# Patient Record
Sex: FEMALE | Race: BLACK OR AFRICAN AMERICAN | NOT HISPANIC OR LATINO | URBAN - METROPOLITAN AREA
[De-identification: names, ages, dates, MRNs, and addresses within clinical notes are randomized per-mention and may not be internally consistent; named-entity substitution may affect disease eponyms.]

---

## 2018-07-06 ENCOUNTER — EMERGENCY (EMERGENCY)
Facility: HOSPITAL | Age: 33
LOS: 1 days | Discharge: HOME | End: 2018-07-06
Attending: EMERGENCY MEDICINE | Admitting: EMERGENCY MEDICINE
Payer: SELF-PAY

## 2018-07-06 VITALS
DIASTOLIC BLOOD PRESSURE: 79 MMHG | TEMPERATURE: 98 F | OXYGEN SATURATION: 100 % | SYSTOLIC BLOOD PRESSURE: 128 MMHG | RESPIRATION RATE: 18 BRPM | HEART RATE: 87 BPM

## 2018-07-06 DIAGNOSIS — Z98.51 TUBAL LIGATION STATUS: Chronic | ICD-10-CM

## 2018-07-06 LAB
ALBUMIN SERPL ELPH-MCNC: 4.6 G/DL — SIGNIFICANT CHANGE UP (ref 3.3–5)
ALP SERPL-CCNC: 44 U/L — SIGNIFICANT CHANGE UP (ref 40–120)
ALT FLD-CCNC: 12 U/L — SIGNIFICANT CHANGE UP (ref 4–33)
ANISOCYTOSIS BLD QL: SIGNIFICANT CHANGE UP
AST SERPL-CCNC: 17 U/L — SIGNIFICANT CHANGE UP (ref 4–32)
BASOPHILS # BLD AUTO: 0.03 K/UL — SIGNIFICANT CHANGE UP (ref 0–0.2)
BASOPHILS NFR BLD AUTO: 0.5 % — SIGNIFICANT CHANGE UP (ref 0–2)
BASOPHILS NFR SPEC: 0 % — SIGNIFICANT CHANGE UP (ref 0–2)
BILIRUB SERPL-MCNC: 0.6 MG/DL — SIGNIFICANT CHANGE UP (ref 0.2–1.2)
BLASTS # FLD: 0 % — SIGNIFICANT CHANGE UP (ref 0–0)
BLD GP AB SCN SERPL QL: NEGATIVE — SIGNIFICANT CHANGE UP
BUN SERPL-MCNC: 10 MG/DL — SIGNIFICANT CHANGE UP (ref 7–23)
CALCIUM SERPL-MCNC: 9.7 MG/DL — SIGNIFICANT CHANGE UP (ref 8.4–10.5)
CHLORIDE SERPL-SCNC: 101 MMOL/L — SIGNIFICANT CHANGE UP (ref 98–107)
CO2 SERPL-SCNC: 27 MMOL/L — SIGNIFICANT CHANGE UP (ref 22–31)
CREAT SERPL-MCNC: 0.74 MG/DL — SIGNIFICANT CHANGE UP (ref 0.5–1.3)
DACRYOCYTES BLD QL SMEAR: SLIGHT — SIGNIFICANT CHANGE UP
EOSINOPHIL # BLD AUTO: 0.21 K/UL — SIGNIFICANT CHANGE UP (ref 0–0.5)
EOSINOPHIL NFR BLD AUTO: 3.3 % — SIGNIFICANT CHANGE UP (ref 0–6)
EOSINOPHIL NFR FLD: 1.8 % — SIGNIFICANT CHANGE UP (ref 0–6)
GIANT PLATELETS BLD QL SMEAR: PRESENT — SIGNIFICANT CHANGE UP
GLUCOSE SERPL-MCNC: 87 MG/DL — SIGNIFICANT CHANGE UP (ref 70–99)
HCT VFR BLD CALC: 26 % — LOW (ref 34.5–45)
HGB BLD-MCNC: 6.8 G/DL — CRITICAL LOW (ref 11.5–15.5)
HYPOCHROMIA BLD QL: SIGNIFICANT CHANGE UP
IMM GRANULOCYTES # BLD AUTO: 0.01 # — SIGNIFICANT CHANGE UP
IMM GRANULOCYTES NFR BLD AUTO: 0.2 % — SIGNIFICANT CHANGE UP (ref 0–1.5)
LYMPHOCYTES # BLD AUTO: 1.56 K/UL — SIGNIFICANT CHANGE UP (ref 1–3.3)
LYMPHOCYTES # BLD AUTO: 24.5 % — SIGNIFICANT CHANGE UP (ref 13–44)
LYMPHOCYTES NFR SPEC AUTO: 23.9 % — SIGNIFICANT CHANGE UP (ref 13–44)
MCHC RBC-ENTMCNC: 15.5 PG — LOW (ref 27–34)
MCHC RBC-ENTMCNC: 26.2 % — LOW (ref 32–36)
MCV RBC AUTO: 59.2 FL — LOW (ref 80–100)
METAMYELOCYTES # FLD: 0 % — SIGNIFICANT CHANGE UP (ref 0–1)
MICROCYTES BLD QL: SIGNIFICANT CHANGE UP
MONOCYTES # BLD AUTO: 0.41 K/UL — SIGNIFICANT CHANGE UP (ref 0–0.9)
MONOCYTES NFR BLD AUTO: 6.4 % — SIGNIFICANT CHANGE UP (ref 2–14)
MONOCYTES NFR BLD: 7.9 % — SIGNIFICANT CHANGE UP (ref 2–9)
MYELOCYTES NFR BLD: 0 % — SIGNIFICANT CHANGE UP (ref 0–0)
NEUTROPHIL AB SER-ACNC: 66.4 % — SIGNIFICANT CHANGE UP (ref 43–77)
NEUTROPHILS # BLD AUTO: 4.14 K/UL — SIGNIFICANT CHANGE UP (ref 1.8–7.4)
NEUTROPHILS NFR BLD AUTO: 65.1 % — SIGNIFICANT CHANGE UP (ref 43–77)
NEUTS BAND # BLD: 0 % — SIGNIFICANT CHANGE UP (ref 0–6)
NRBC # FLD: 0 — SIGNIFICANT CHANGE UP
OTHER - HEMATOLOGY %: 0 — SIGNIFICANT CHANGE UP
PLATELET # BLD AUTO: 481 K/UL — HIGH (ref 150–400)
PLATELET COUNT - ESTIMATE: NORMAL — SIGNIFICANT CHANGE UP
PMV BLD: 9.4 FL — SIGNIFICANT CHANGE UP (ref 7–13)
POIKILOCYTOSIS BLD QL AUTO: SIGNIFICANT CHANGE UP
POLYCHROMASIA BLD QL SMEAR: SLIGHT — SIGNIFICANT CHANGE UP
POTASSIUM SERPL-MCNC: 3.9 MMOL/L — SIGNIFICANT CHANGE UP (ref 3.5–5.3)
POTASSIUM SERPL-SCNC: 3.9 MMOL/L — SIGNIFICANT CHANGE UP (ref 3.5–5.3)
PROMYELOCYTES # FLD: 0 % — SIGNIFICANT CHANGE UP (ref 0–0)
PROT SERPL-MCNC: 8.3 G/DL — SIGNIFICANT CHANGE UP (ref 6–8.3)
RBC # BLD: 4.39 M/UL — SIGNIFICANT CHANGE UP (ref 3.8–5.2)
RBC # FLD: 21.9 % — HIGH (ref 10.3–14.5)
REVIEW TO FOLLOW: YES — SIGNIFICANT CHANGE UP
RH IG SCN BLD-IMP: POSITIVE — SIGNIFICANT CHANGE UP
SCHISTOCYTES BLD QL AUTO: SLIGHT — SIGNIFICANT CHANGE UP
SODIUM SERPL-SCNC: 139 MMOL/L — SIGNIFICANT CHANGE UP (ref 135–145)
TARGETS BLD QL SMEAR: SLIGHT — SIGNIFICANT CHANGE UP
VARIANT LYMPHS # BLD: 0 % — SIGNIFICANT CHANGE UP
WBC # BLD: 6.36 K/UL — SIGNIFICANT CHANGE UP (ref 3.8–10.5)
WBC # FLD AUTO: 6.36 K/UL — SIGNIFICANT CHANGE UP (ref 3.8–10.5)

## 2018-07-06 PROCEDURE — 99218: CPT

## 2018-07-06 PROCEDURE — 76830 TRANSVAGINAL US NON-OB: CPT | Mod: 26

## 2018-07-06 NOTE — ED CDU PROVIDER INITIAL DAY NOTE - MEDICAL DECISION MAKING DETAILS
symptomatic anemia secondary to metrorrhagia - will transfuse 2 units of PRBCs, TSUS, H&H recheck and gyn f/u.

## 2018-07-06 NOTE — ED CDU PROVIDER INITIAL DAY NOTE - ATTENDING CONTRIBUTION TO CARE
I performed a face to face bedside interview with patient regarding history of present illness, review of symptoms and past medical history. I completed an independent physical exam.  I have discussed patient's plan of care.   I agree with note as stated above, having amended the EMR as needed to reflect my findings. I have discussed the assessment and plan of care.  This includes during the time I functioned as the attending physician for this patient.  Attending Contribution to Care: agree with plan of PA. pt p/w report of anemia found in PMD. pt admits to mild exertional dyspnea a/w running after children. denies cp. staes has excessively heavy menstrual cycles with 1 pad per 1.5 hrs. has fam hx of fibroids. denies headache, syncope, loc. stable for transfer to cdu for tranfusion.

## 2018-07-06 NOTE — ED PROVIDER NOTE - MEDICAL DECISION MAKING DETAILS
32yoF pw anemia. will check h/h, possibly transfuse. patient set to have OBGYN and hematology outpatient work up with pmd. transfuse vs cdu for transfusion.

## 2018-07-06 NOTE — ED ADULT TRIAGE NOTE - CHIEF COMPLAINT QUOTE
Sent by PCP for low H/H 7/25 on bloodwork. Reports worsening fatigue x several weeks, Reports PMH of anemia, no history of transfusions/

## 2018-07-06 NOTE — ED CDU PROVIDER INITIAL DAY NOTE - OBJECTIVE STATEMENT
Pt is 33 y/o female with no significant PMHx here for eval of lw H&H. Pt is 31 y/o female with no significant PMHx here for eval of lw H&H. Pt states that she has been having heavy menses for the past 7 yrs Pt is 33 y/o female with no significant PMHx here for eval of lw H&H. Pt states that she has been having heavy menses for the past 7 yrs (since her 2nd daughter was born); Pt is 31 y/o female with  PMHx of anemia secondary to metrorrhagia here for eval of lw H&H. Pt states that she has been having heavy menses for the past 7 yrs (since her 2nd daughter was born); Pt states  that she has been bleeding for 5-7 days,  goes through a pad every 1.5hrs for the 1st 3 days with clots passage. Pt admits that she was told in the past that she is anemic, her counts has been dropping over the yrs but pt is note sure what values. Saw here PCP recently as she has been feeling weak and tired, was noted to have Hb of 6.8 and sent to the ER for transfusion. Currently finishing her period, ucg neg. Pt states that he pcp refrred her to see gyn doctor but she hasn't seen one yet. (-) abd pain, n/v/d, denies urinary sx, doesn't recall having us done since last pregnancy 7 yrs ago. (-) syncope, dizziness, cp or sob.

## 2018-07-06 NOTE — ED PROVIDER NOTE - PROGRESS NOTE DETAILS
Franc PGY1: pt signed out to me, pending labs, clinically well appearing, no sob/ dizziness/ vision changes, + fatigue.

## 2018-07-06 NOTE — ED PROVIDER NOTE - ATTENDING CONTRIBUTION TO CARE
I performed a face to face bedside interview with patient regarding history of present illness, review of symptoms and past medical history. I completed an independent physical exam.  I have discussed patient's plan of care.   I agree with note as stated above, having amended the EMR as needed to reflect my findings. I have discussed the assessment and plan of care.  This includes during the time I functioned as the attending physician for this patient.  Attending Contribution to Care: agree with plan of resident. pt p/w report of anemia found in PMD. pt admits to mild exertional dyspnea a/w running after children. denies cp. staes has excessively heavy menstrual cycles with 1 pad per 1.5 hrs. has fam hx of fibroids. denies headache, syncope, loc. stable for transfer to cdu for tranfusion.

## 2018-07-06 NOTE — ED PROVIDER NOTE - OBJECTIVE STATEMENT
32yoF hx of anemia in the past, sent in by PMD for low H/H. patient endorses exhaustion for past few weeks but attributes this to her kids. Patient also with history of heavy menstration, using lasting 7 days, using a pad every 1-2 hours. never been worked up before. no bleeding now. denies black stools, nausea, vomiting, diarrhea, chest pain, sob, weakness, numbness, tingling abd pain or other issues.     Dr. Hanson [phone]

## 2018-07-06 NOTE — ED ADULT NURSE NOTE - OBJECTIVE STATEMENT
Patient received AA&Ox3 sent from PMD for low h/h (7/25) on blood work taken a week ago - pt. was seen today to review results. Patient endorses fatigue and exhaustion in the past few weeks; however, pt. has not been seen for these sx - pt. also states that she has a h/o heavy menstrual bleeding, using approximately 1 pad q1-2hrs. VSS on RA. Patient denies chest pain, N/V, SOB, fever, chills, dyspnea, dizziness at this time. Patient ambulates independently, skin intact. 20g PIV in place to right AC, labs drawn, NAD noted - will continue to monitor.

## 2018-07-07 VITALS
OXYGEN SATURATION: 100 % | SYSTOLIC BLOOD PRESSURE: 104 MMHG | RESPIRATION RATE: 18 BRPM | DIASTOLIC BLOOD PRESSURE: 58 MMHG | HEART RATE: 64 BPM | TEMPERATURE: 98 F

## 2018-07-07 LAB
HCT VFR BLD CALC: 30.2 % — LOW (ref 34.5–45)
HGB BLD-MCNC: 8.7 G/DL — LOW (ref 11.5–15.5)
MCHC RBC-ENTMCNC: 18.4 PG — LOW (ref 27–34)
MCHC RBC-ENTMCNC: 28.8 % — LOW (ref 32–36)
MCV RBC AUTO: 63.8 FL — LOW (ref 80–100)
NRBC # FLD: 0 — SIGNIFICANT CHANGE UP
PLATELET # BLD AUTO: 426 K/UL — HIGH (ref 150–400)
PMV BLD: 9.3 FL — SIGNIFICANT CHANGE UP (ref 7–13)
RBC # BLD: 4.73 M/UL — SIGNIFICANT CHANGE UP (ref 3.8–5.2)
RBC # FLD: 26.5 % — HIGH (ref 10.3–14.5)
WBC # BLD: 4.3 K/UL — SIGNIFICANT CHANGE UP (ref 3.8–10.5)
WBC # FLD AUTO: 4.3 K/UL — SIGNIFICANT CHANGE UP (ref 3.8–10.5)

## 2018-07-07 PROCEDURE — 99217: CPT

## 2018-07-07 RX ORDER — FERROUS GLUCONATE 100 %
1 POWDER (GRAM) MISCELLANEOUS
Qty: 30 | Refills: 0 | OUTPATIENT
Start: 2018-07-07 | End: 2018-08-05

## 2018-07-07 RX ORDER — DOCUSATE SODIUM 100 MG
1 CAPSULE ORAL
Qty: 60 | Refills: 0 | OUTPATIENT
Start: 2018-07-07 | End: 2018-08-05

## 2018-07-07 NOTE — ED CDU PROVIDER SUBSEQUENT DAY NOTE - HISTORY
Pt is 33 y/o female with  PMHx of anemia secondary to metrorrhagia here for eval of lw H&H. Pt states that she has been having heavy menses for the past 7 yrs (since her 2nd daughter was born); Pt states  that she has been bleeding for 5-7 days,  goes through a pad every 1.5hrs for the 1st 3 days with clots passage. Pt admits that she was told in the past that she is anemic, her counts has been dropping over the yrs but pt is note sure what values. Saw here PCP recently as she has been feeling weak and tired, was noted to have Hb of 6.8 and sent to the ER for transfusion. Currently finishing her period, ucg neg. Pt states that he pcp refrred her to see gyn doctor but she hasn't seen one yet. (-) abd pain, n/v/d, denies urinary sx, doesn't recall having us done since last pregnancy 7 yrs ago. (-) syncope, dizziness, cp or sob.

## 2018-07-07 NOTE — ED CDU PROVIDER SUBSEQUENT DAY NOTE - ATTENDING CONTRIBUTION TO CARE
CDU MD ALARCON:  I performed a face to face bedside interview with patient regarding history of present illness, review of symptoms and past medical history. I completed an independent physical exam.  I have discussed patient's plan of care with PA.   I agree with note as stated above, having amended the EMR as needed to reflect my findings. I have discussed the assessment and plan of care.  This includes during the time I functioned as the attending physician for this patient.

## 2018-07-07 NOTE — ED CDU PROVIDER SUBSEQUENT DAY NOTE - PROGRESS NOTE DETAILS
Patient states she is feeling better. Has finished blood transfusion a short while ago. Exam: heart- RRR s1s2 nl Lungs - clear bilaterally abd - soft NT ND extrem- no edema or cyanosis . Plan for repeat cbc post transfusion. Will also start on iron supplement. Patient will need O/P follow up and cbc monitoring as O/P Continue to monitor. MD ALARCON:  Pt feels much better.  No fatigue or lightheadedness.  Completed xfusion this am.  Will rpt h/h, if improved will dc with iron rx, pcp and gyn f/u.

## 2018-07-07 NOTE — ED CDU PROVIDER DISPOSITION NOTE - PLAN OF CARE
Resolution of symptoms Follow up with your PMd Dr Hanosn within 1 week. Call for appointment. Bring copies of your ER lab reports with you to MD appointment. Take Ferrous gluconate 1 tab once daily for anemia,. Take Colace 100mg twice daily for constipation.  Monitor your CBC with your PMD. Return to ER if symptoms return or worsen or if other concerns arise.

## 2018-07-07 NOTE — ED CDU PROVIDER DISPOSITION NOTE - CLINICAL COURSE
31 y/o female sent to cdu for anemia, likely secondary to known heavy menses, pt noncompliant with iron rx in past.  Received 2 units without complication, h/h rising appropriately, no longer symptomatic.  Discharged with iron rx, pcp f/u.